# Patient Record
Sex: MALE | Race: WHITE | NOT HISPANIC OR LATINO | Employment: FULL TIME | URBAN - METROPOLITAN AREA
[De-identification: names, ages, dates, MRNs, and addresses within clinical notes are randomized per-mention and may not be internally consistent; named-entity substitution may affect disease eponyms.]

---

## 2024-05-27 ENCOUNTER — HOSPITAL ENCOUNTER (EMERGENCY)
Facility: CLINIC | Age: 49
Discharge: HOME | End: 2024-05-27
Attending: STUDENT IN AN ORGANIZED HEALTH CARE EDUCATION/TRAINING PROGRAM | Admitting: STUDENT IN AN ORGANIZED HEALTH CARE EDUCATION/TRAINING PROGRAM
Payer: COMMERCIAL

## 2024-05-27 VITALS
WEIGHT: 194.6 LBS | OXYGEN SATURATION: 95 % | HEART RATE: 66 BPM | RESPIRATION RATE: 20 BRPM | SYSTOLIC BLOOD PRESSURE: 141 MMHG | HEIGHT: 70 IN | BODY MASS INDEX: 27.86 KG/M2 | TEMPERATURE: 98.5 F | DIASTOLIC BLOOD PRESSURE: 97 MMHG

## 2024-05-27 DIAGNOSIS — F41.9 ANXIETY: ICD-10-CM

## 2024-05-27 DIAGNOSIS — G47.00 INSOMNIA, UNSPECIFIED TYPE: ICD-10-CM

## 2024-05-27 PROBLEM — F43.23 ADJUSTMENT DISORDER WITH MIXED ANXIETY AND DEPRESSED MOOD: Status: ACTIVE | Noted: 2024-05-27

## 2024-05-27 PROCEDURE — 99283 EMERGENCY DEPT VISIT LOW MDM: CPT

## 2024-05-27 PROCEDURE — 99284 EMERGENCY DEPT VISIT MOD MDM: CPT | Performed by: NURSE PRACTITIONER

## 2024-05-27 PROCEDURE — 250N000013 HC RX MED GY IP 250 OP 250 PS 637: Performed by: NURSE PRACTITIONER

## 2024-05-27 RX ORDER — HYDROXYZINE HYDROCHLORIDE 10 MG/1
10 TABLET, FILM COATED ORAL 3 TIMES DAILY PRN
Status: DISCONTINUED | OUTPATIENT
Start: 2024-05-27 | End: 2024-05-27 | Stop reason: HOSPADM

## 2024-05-27 RX ORDER — TRAZODONE HYDROCHLORIDE 50 MG/1
50 TABLET, FILM COATED ORAL AT BEDTIME
Qty: 30 TABLET | Refills: 0 | Status: SHIPPED | OUTPATIENT
Start: 2024-05-27

## 2024-05-27 RX ORDER — GABAPENTIN 100 MG/1
100 CAPSULE ORAL
Status: DISCONTINUED | OUTPATIENT
Start: 2024-05-27 | End: 2024-05-27 | Stop reason: HOSPADM

## 2024-05-27 RX ORDER — GUANFACINE 2 MG/1
2 TABLET, EXTENDED RELEASE ORAL EVERY MORNING
Status: DISCONTINUED | OUTPATIENT
Start: 2024-05-28 | End: 2024-05-27 | Stop reason: HOSPADM

## 2024-05-27 RX ORDER — GABAPENTIN 100 MG/1
100 CAPSULE ORAL
COMMUNITY
Start: 2023-12-18

## 2024-05-27 RX ORDER — GUANFACINE 2 MG/1
2 TABLET, EXTENDED RELEASE ORAL EVERY MORNING
COMMUNITY
Start: 2024-02-07

## 2024-05-27 RX ADMIN — GABAPENTIN 100 MG: 100 CAPSULE ORAL at 11:51

## 2024-05-27 ASSESSMENT — ACTIVITIES OF DAILY LIVING (ADL)
ADLS_ACUITY_SCORE: 35
ADLS_ACUITY_SCORE: 33
ADLS_ACUITY_SCORE: 35
ADLS_ACUITY_SCORE: 35

## 2024-05-27 ASSESSMENT — COLUMBIA-SUICIDE SEVERITY RATING SCALE - C-SSRS
1. IN THE PAST MONTH, HAVE YOU WISHED YOU WERE DEAD OR WISHED YOU COULD GO TO SLEEP AND NOT WAKE UP?: NO
6. HAVE YOU EVER DONE ANYTHING, STARTED TO DO ANYTHING, OR PREPARED TO DO ANYTHING TO END YOUR LIFE?: NO
2. HAVE YOU ACTUALLY HAD ANY THOUGHTS OF KILLING YOURSELF IN THE PAST MONTH?: NO

## 2024-05-27 NOTE — ED PROVIDER NOTES
[facility] Unit - Psychiatric Consultation  [facility] Emergency Department    Anson Ybarra MRN: [id number]   Age: 49 year old YOB: 1975     History     Chief Complaint   Patient presents with    Anxiety    Insomnia     HPI  Anson Ybarra is a 49 year old male with history notable for anxiety, hypertension, and a constellation of symptoms felt related to long-COVID. Patient presented to the emergency department for evaluation of worsening anxiety and insomnia for about the past month. Patient was medically evaluated in the emergency department and determined to be medically stable for transfer to [facility] for further psychiatric assessment. Patient is nearing 4.5 hours in emergency care. Here at [facility], patient reports he was diagnosed with COVID in May 2022. Since that time, he has struggled with anxiety, nerve sensitivity, insomnia, among other symptoms. Patient reports that over the past month, he has been experiencing an increase in middle of night awakenings, with difficulty falling back to sleep. In addition, he is also experiencing increased anxiety and feelings of panic, which are not attributable to any thoughts or worries, as far as patient can tell. Reports last night, he awoke during the night and was awake for around 4 hours. He went to exercise, then read a book. He reported thinking that he needed to escape the situation, but denies any active thoughts or intent to harm himself or end his life. He reports he utilizes 100 mg gabapentin 5 times daily. He will sometimes take a dose at 0200. He finds if he does not take the 0200 dose, he will sometimes wake up around 0400 and experience an even greater challenge falling back to sleep. He is working with a psychiatrist, therapist, and acupuncturist. He had been prescribed venlafaxine in the past, which did help to decrease anxiety, but patient reported it also seemed to suppress his emotion. Reports a lot of side effects when he was tapered  "off, but does ultimately feel better off the venlafaxine and is not interested in an additional antidepressant trial at this time. We discuss utilizing trazodone as needed and continuing to follow-up outpatient. Patient denies any suicidal thinking. No evidence of psychosis, rubi, or homicidal thinking. Patient to be discharged home.     Past Medical History  No past medical history on file.  No past surgical history on file.  gabapentin (NEURONTIN) 100 MG capsule  guanFACINE (INTUNIV) 2 MG TB24 24 hr tablet  traZODone (DESYREL) 50 MG tablet      No Known Allergies  Family History  No family history on file.  Social History   Social History     Tobacco Use    Smoking status: Never    Smokeless tobacco: Never          Review of Systems  A medically appropriate review of systems was performed with pertinent positives and negatives noted in the HPI, and all other systems negative.    Physical Examination   BP: (!) 171/112  Pulse: 73  Temp: 97.8  F (36.6  C)  Resp: 20  Height: 177.8 cm (5' 10\")  Weight: 88.3 kg (194 lb 9.6 oz)  SpO2: 98 %    Physical Exam  General: Appears stated age.   Neuro: Alert and fully oriented. Extremities appear to demonstrate normal strength on visual inspection.   Integumentary/Skin: no rash visualized, normal color    Psychiatric Examination   Appearance: awake, alert, adequately groomed, appeared as age stated, and casually dressed  Attitude:  cooperative  Eye Contact:  good  Mood:  anxious  Affect:  mood congruent and intensity is normal  Speech:  clear, coherent and normal prosody  Psychomotor Behavior:  no evidence of tardive dyskinesia, dystonia, or tics  Thought Process:  linear and goal oriented  Associations:  no loose associations  Thought Content:  no evidence of suicidal ideation or homicidal ideation, no evidence of psychotic thought, no auditory hallucinations present, and no visual hallucinations present  Insight:  good  Judgement:  intact  Oriented to:  time, person, and " place  Attention Span and Concentration:  fair  Recent and Remote Memory:  fair  Language: able to name/identify objects without impairment  Fund of Knowledge: intact with awareness of current and past events    ED Course     ED Course as of 05/27/24 1417   Mon May 27, 2024   1001 I obtained history and examined the patient as noted above         Labs Ordered and Resulted from Time of ED Arrival to Time of ED Departure - No data to display    Assessments & Plan (with Medical Decision Making)   Patient presenting with increased anxiety and insomnia symptoms over the past month or so. Working with a psychiatrist, therapist, as well as an acupuncturist. Symptoms have been felt to be related to long-COVID in the past. Nursing notes reviewed noting no acute issues.     I have reviewed the assessment completed by the [facility].     Preliminary diagnosis:    ICD-10-CM    1. Anxiety  F41.9       2. Insomnia, unspecified type  G47.00            Treatment Plan:  - Prescribe trazodone 50 mg at bedtime PRN for insomnia  - Continue Intuniv 2 mg daily  - Continue gabapentin 100 mg five times daily  - Okay to utilize hydroxyzine if experiencing middle of night awakenings. Pt taking about 1/4 of a 25 mg tablet. Reviewed mechanism of action.   - Patient declines initiation of an antidepressant due to previous concern that venlafaxine contributed to suppression of emotions.   - Patient encouraged to follow-up with outpatient psychiatrist. Continue outpatient psychotherapy.   - Patient to be discharged home today    --  Chinedu Dominique CNP   [facility] EMERGENCY DEPT  Em[facility] Unit      Chinedu Dominique CNP  05/27/24 1091

## 2024-05-27 NOTE — PROGRESS NOTES
Triage & Transition Services, Extended Care     Client Name: Anson Ybarra    Date: May 27, 2024    Patient was seen    Mode of Assessment:      Service Type: (P) attended group session  Session Start Time:  (P) 1325    Session End Time: (P) 1355  Session Length: (P) 30  Site Location: [facility] EMERGENCY DEPT                             EMP15  Total Number ofAttendees: (P) 4  Topic:   (P) emotions/expression, self-care activities, structured socialization   Response: (P) able to recall/repeat info presented, cooperative with task, discussed personal experience with topic, expressed understanding of topic, other (see comments) (Pt excused himself after 15 minutes, explaining that he was not feeling well and needed a break.)     Emerita Zuñiga   Licensed Mental Health Professional (LMHP), [facility]  [phone]

## 2024-05-27 NOTE — ED NOTES
[facility]  ED to EMPATH Checklist:      Goal for EMPATH: Anxiety management    Current Behavior: Anxious and Sad    Safety Concerns: None    Legal Hold Status: Voluntary    Medically Cleared by ED provider: Yes    Patient Therapeutically Searched: Not searched - Currently in triage    Belongings: Remain with patient    Independent Ambulation at Baseline: Yes/No: Yes    Participates in Care/Conversation: Yes/No: Yes    Patient Informed about EMPATH: Yes/No: Yes    DEC: Ordered and pending    Patient Ready to be Transferred to EMPATH? Yes/No: Yes

## 2024-05-27 NOTE — ED NOTES
Discharge instructions reviewed with patient including follow-up care plan. Educated on medication regime and advised not to stop prescribed medication without consulting their physician.Patient sent home with medication. Reviewed safety plan and outpatient resources/appointments.Verbalized understanding of discharge instructions. Denies SI. All belongings which where brought into the hospital have been returned to patient. Escorted off the unit @ 0228 with staff.     Discharged to home with his wife.

## 2024-05-27 NOTE — DISCHARGE INSTRUCTIONS
Resources:  [employer] - https://traumahealing.org/  Karl Velázquez    Polyvagal Theory  [email or web address].com/polyvagal-theory/  Darío Garcia and Adelaida Shultz

## 2024-05-27 NOTE — ED NOTES
Patient brought over from ED triage. Patient had covid in May of 2022.  He recovered with long covid symptoms.  He has been in 2 long covid programs at the [facility]. In the last month he has started to  have increased in frequency and intensity of panic attacks that usually occur around 4am or about that time.  He had some left over hydroxyzine and he did take that but stated 25mg was too much so he cut it down to about 6-7mg.  He has fleeting suicidal thoughts when the panic gets bad but does not want to be dead.  He is pleasant and cooperative. Nursing and risk assessments completed.  Assessments reviewed with LMHP and physician. Video monitoring in progress, patient informed.  Admission information reviewed with patient. Patient given a tour of [facility] and instructions on using the facility. Questions regarding [facility] addressed. Pt search completed and belongings inventoried.

## 2024-05-27 NOTE — ED TRIAGE NOTES
"Pt complains of worsening anxiety and more frequent panic attacks for the last month.  Pt states he has been struggling with long covid symptoms.  Pt states he \"want it all to go away\", denies SI or past self harm.  Requesting to go to EMPATH.     Triage Assessment (Adult)       Row Name 05/27/24 0952          Triage Assessment    Airway WDL WDL        Respiratory WDL    Respiratory WDL WDL        Skin Circulation/Temperature WDL    Skin Circulation/Temperature WDL WDL        Cardiac WDL    Cardiac WDL WDL        Peripheral/Neurovascular WDL    Peripheral Neurovascular WDL WDL        Cognitive/Neuro/Behavioral WDL    Cognitive/Neuro/Behavioral WDL WDL                     "

## 2024-05-27 NOTE — ED PROVIDER NOTES
"  Emergency Department Note      History of Present Illness     Chief Complaint  Anxiety and Insomnia    HPI  Anson Ybarra is a 49 year old male with history of hypertension who presents at the emergency department for evaluation of anxiety and insomnia. The patient explains that since 3/12/24 he has had 2-4 nights a week \"wide awake nights\" with no sleep. In the past month he notes this has brought about increased panic and anxiety. He notes slight loss of appetite but denies suicidal ideation. The patient reports taking 25mg of hydroxyzine from an unused prescription 2 days ago, and 7mg at 0300 this morning to aid his symptoms. He mentions history of MADDI that he is not actively treating, but takes gabapentin and guanfacine daily for residual COVID symptoms since May 2022.     Independent Historian  None    Review of External Notes  None    Past Medical History   Medical History and Problem List  Hypertension   MADDI  Long COVID  SIOBHAN    Medications  Wellbutrin  Lexapro  Prozac  Ativan  Cozaar  Effexor  Gabapentin  Intuniv    Physical Exam   Patient Vitals for the past 24 hrs:   BP Temp Temp src Pulse Resp SpO2 Height Weight   05/27/24 1030 (!) 141/97 98.5  F (36.9  C) Oral 66 20 95 % 1.778 m (5' 10\") 88.3 kg (194 lb 9.6 oz)   05/27/24 0952 (!) 171/112 97.8  F (36.6  C) Temporal 73 20 98 % -- --     Physical Exam  GENERAL: Patient well-appearing  HEAD: Atraumatic.  NECK: No rigidity  CV: RRR, no murmurs rubs or gallops  PULM: CTAB with good aeration; no retractions, rales, rhonchi, or wheezing  ABD: Soft, nontender, nondistended, no guarding  DERM: No rash. Skin warm and dry  EXTREMITY: Moving all extremities without difficulty.  Psych: Calm and cooperative.  Answers questions appropriately.  No active SI or hallucinations.    Diagnostics   Lab Results   Labs Ordered and Resulted from Time of ED Arrival to Time of ED Departure - No data to display    Imaging  No orders to display       Independent " Interpretation  None  ED Course    Medications Administered  Medications   gabapentin (NEURONTIN) capsule 100 mg (100 mg Oral $Given 5/27/24 1151)   guanFACINE (INTUNIV) 24 hr tablet 2 mg (has no administration in time range)       Discussion of Management  None    Social Determinants of Health adding to complexity of care  None    ED Course  ED Course as of 05/27/24 1237   Mon May 27, 2024   1001 I obtained history and examined the patient as noted above       Medical Decision Making / Diagnosis   CMS Diagnoses: None    MIPS     None    MDM  Patient presents with anxiety    Chronic condition complicating - anxiety  No current SI, HI, VH, AH.   DDx considered suicidal ideation, psychosis, adjustment disorder.   No acute medical concerns other than long COVID syndrome which I do not think requires emergent labs or imaging.  Patient is a good candidate for the empath unit so was transferred to that unit.      Disposition  The patient was transferred to Em[facility].     ICD-10 Codes:    ICD-10-CM    1. Anxiety reaction  F41.1       2. Other insomnia  G47.09              Scribe Disclosure:  IDio, am serving as a scribe  for Kp Mtz at 12:20 PM on 5/27/2024  I, Kp Mtz, am serving as a scribe at 12:20 PM on 5/27/2024 to document services personally performed by Carlos Perez MD based on my observations and the provider's statements to me.        Carlos Perez MD  05/27/24 4342

## 2024-06-16 ENCOUNTER — HEALTH MAINTENANCE LETTER (OUTPATIENT)
Age: 49
End: 2024-06-16

## 2025-06-21 ENCOUNTER — HEALTH MAINTENANCE LETTER (OUTPATIENT)
Age: 50
End: 2025-06-21